# Patient Record
Sex: MALE | Race: WHITE | Employment: OTHER | URBAN - NONMETROPOLITAN AREA
[De-identification: names, ages, dates, MRNs, and addresses within clinical notes are randomized per-mention and may not be internally consistent; named-entity substitution may affect disease eponyms.]

---

## 2017-01-19 RX ORDER — LOSARTAN POTASSIUM 50 MG/1
TABLET ORAL
Qty: 180 TABLET | Refills: 2 | Status: SHIPPED | OUTPATIENT
Start: 2017-01-19 | End: 2017-04-25 | Stop reason: ALTCHOICE

## 2017-04-17 ENCOUNTER — HOSPITAL ENCOUNTER (OUTPATIENT)
Age: 82
Discharge: HOME | End: 2017-04-17
Payer: MEDICARE

## 2017-04-17 DIAGNOSIS — E78.5 HYPERLIPIDEMIA, UNSPECIFIED HYPERLIPIDEMIA TYPE: ICD-10-CM

## 2017-04-17 DIAGNOSIS — I10 ESSENTIAL HYPERTENSION: ICD-10-CM

## 2017-04-17 DIAGNOSIS — D86.9 SARCOID: ICD-10-CM

## 2017-04-17 DIAGNOSIS — I49.3 PVC (PREMATURE VENTRICULAR CONTRACTION): ICD-10-CM

## 2017-04-17 DIAGNOSIS — E55.9 VITAMIN D DEFICIENCY DISEASE: ICD-10-CM

## 2017-04-17 DIAGNOSIS — Z95.0 PACEMAKER: ICD-10-CM

## 2017-04-17 LAB
ABSOLUTE EOS #: 0.3 K/UL (ref 0–0.4)
ABSOLUTE LYMPH #: 1 K/UL (ref 0.9–2.5)
ABSOLUTE MONO #: 0.6 K/UL (ref 0–1)
ALBUMIN SERPL-MCNC: 3.7 G/DL (ref 3.5–5.2)
ALBUMIN/GLOBULIN RATIO: ABNORMAL (ref 1–2.5)
ALP BLD-CCNC: 67 U/L (ref 40–129)
ALT SERPL-CCNC: 43 U/L (ref 5–41)
ANION GAP SERPL CALCULATED.3IONS-SCNC: 12 MMOL/L (ref 9–17)
AST SERPL-CCNC: 42 U/L
BASOPHILS # BLD: 1 % (ref 0–2)
BASOPHILS ABSOLUTE: 0 K/UL (ref 0–0.2)
BILIRUB SERPL-MCNC: 0.4 MG/DL (ref 0.3–1.2)
BUN BLDV-MCNC: 25 MG/DL (ref 8–23)
BUN/CREAT BLD: 16 (ref 9–20)
CALCIUM SERPL-MCNC: 8.9 MG/DL (ref 8.6–10.4)
CHLORIDE BLD-SCNC: 99 MMOL/L (ref 98–107)
CHOLESTEROL/HDL RATIO: 5
CHOLESTEROL: 144 MG/DL
CO2: 27 MMOL/L (ref 20–31)
CREAT SERPL-MCNC: 1.59 MG/DL (ref 0.7–1.2)
DIFFERENTIAL TYPE: YES
EOSINOPHILS RELATIVE PERCENT: 5 % (ref 0–5)
GFR AFRICAN AMERICAN: 51 ML/MIN
GFR NON-AFRICAN AMERICAN: 42 ML/MIN
GFR SERPL CREATININE-BSD FRML MDRD: ABNORMAL ML/MIN/{1.73_M2}
GFR SERPL CREATININE-BSD FRML MDRD: ABNORMAL ML/MIN/{1.73_M2}
GLUCOSE BLD-MCNC: 108 MG/DL (ref 70–99)
HCT VFR BLD CALC: 38 % (ref 41–53)
HDLC SERPL-MCNC: 29 MG/DL
HEMOGLOBIN: 12.5 G/DL (ref 13.5–17.5)
LDL CHOLESTEROL: 67 MG/DL (ref 0–130)
LYMPHOCYTES # BLD: 15 % (ref 13–44)
MAGNESIUM: 2.1 MG/DL (ref 1.6–2.6)
MCH RBC QN AUTO: 28.7 PG (ref 26–34)
MCHC RBC AUTO-ENTMCNC: 32.9 G/DL (ref 31–37)
MCV RBC AUTO: 87.3 FL (ref 80–100)
MONOCYTES # BLD: 10 % (ref 5–9)
PATIENT FASTING?: YES
PDW BLD-RTO: 15.7 % (ref 12.1–15.2)
PLATELET # BLD: 188 K/UL (ref 140–450)
PLATELET ESTIMATE: ABNORMAL
PMV BLD AUTO: ABNORMAL FL (ref 6–12)
POTASSIUM SERPL-SCNC: 4.6 MMOL/L (ref 3.7–5.3)
RBC # BLD: 4.36 M/UL (ref 4.5–5.9)
RBC # BLD: ABNORMAL 10*6/UL
SEG NEUTROPHILS: 69 % (ref 39–75)
SEGMENTED NEUTROPHILS ABSOLUTE COUNT: 4.5 K/UL (ref 2.1–6.5)
SODIUM BLD-SCNC: 138 MMOL/L (ref 135–144)
TOTAL PROTEIN: 7.1 G/DL (ref 6.4–8.3)
TRIGL SERPL-MCNC: 241 MG/DL
TSH SERPL DL<=0.05 MIU/L-ACNC: 2.22 MIU/L (ref 0.3–5)
VITAMIN D 25-HYDROXY: 25 NG/ML (ref 30–100)
VLDLC SERPL CALC-MCNC: 48 MG/DL (ref 1–30)
WBC # BLD: 6.5 K/UL (ref 3.5–11)
WBC # BLD: ABNORMAL 10*3/UL

## 2017-04-17 PROCEDURE — 82306 VITAMIN D 25 HYDROXY: CPT

## 2017-04-17 PROCEDURE — 85025 COMPLETE CBC W/AUTO DIFF WBC: CPT

## 2017-04-17 PROCEDURE — 36415 COLL VENOUS BLD VENIPUNCTURE: CPT

## 2017-04-17 PROCEDURE — 80053 COMPREHEN METABOLIC PANEL: CPT

## 2017-04-17 PROCEDURE — 84443 ASSAY THYROID STIM HORMONE: CPT

## 2017-04-17 PROCEDURE — 93005 ELECTROCARDIOGRAM TRACING: CPT

## 2017-04-17 PROCEDURE — 80061 LIPID PANEL: CPT

## 2017-04-17 PROCEDURE — 83735 ASSAY OF MAGNESIUM: CPT

## 2017-04-20 LAB
EKG ATRIAL RATE: 50 BPM
EKG Q-T INTERVAL: 554 MS
EKG QRS DURATION: 186 MS
EKG QTC CALCULATION (BAZETT): 562 MS
EKG R AXIS: -87 DEGREES
EKG T AXIS: 104 DEGREES
EKG VENTRICULAR RATE: 62 BPM

## 2017-04-25 ENCOUNTER — OFFICE VISIT (OUTPATIENT)
Dept: CARDIOLOGY CLINIC | Age: 82
End: 2017-04-25
Payer: MEDICARE

## 2017-04-25 VITALS
OXYGEN SATURATION: 94 % | DIASTOLIC BLOOD PRESSURE: 60 MMHG | HEART RATE: 66 BPM | BODY MASS INDEX: 36.77 KG/M2 | WEIGHT: 249 LBS | SYSTOLIC BLOOD PRESSURE: 98 MMHG

## 2017-04-25 DIAGNOSIS — E78.5 HYPERLIPIDEMIA, UNSPECIFIED HYPERLIPIDEMIA TYPE: ICD-10-CM

## 2017-04-25 DIAGNOSIS — E55.9 VITAMIN D DEFICIENCY DISEASE: ICD-10-CM

## 2017-04-25 DIAGNOSIS — I27.20 PULMONARY HTN (HCC): ICD-10-CM

## 2017-04-25 DIAGNOSIS — R06.02 SOB (SHORTNESS OF BREATH): Primary | ICD-10-CM

## 2017-04-25 DIAGNOSIS — I31.39 PERICARDIAL EFFUSION: ICD-10-CM

## 2017-04-25 DIAGNOSIS — Z95.0 PACEMAKER: ICD-10-CM

## 2017-04-25 DIAGNOSIS — I10 ESSENTIAL HYPERTENSION: ICD-10-CM

## 2017-04-25 PROCEDURE — 99214 OFFICE O/P EST MOD 30 MIN: CPT | Performed by: INTERNAL MEDICINE

## 2017-04-25 PROCEDURE — 93288 INTERROG EVL PM/LDLS PM IP: CPT | Performed by: INTERNAL MEDICINE

## 2017-04-25 RX ORDER — OXYBUTYNIN CHLORIDE 5 MG/1
5 TABLET ORAL 3 TIMES DAILY
COMMUNITY
End: 2017-12-01

## 2017-04-25 RX ORDER — TAMSULOSIN HYDROCHLORIDE 0.4 MG/1
0.4 CAPSULE ORAL 2 TIMES DAILY
COMMUNITY

## 2017-04-25 RX ORDER — FLUTICASONE PROPIONATE 110 UG/1
1 AEROSOL, METERED RESPIRATORY (INHALATION) 2 TIMES DAILY
COMMUNITY
End: 2017-12-01 | Stop reason: ALTCHOICE

## 2017-04-25 RX ORDER — LOSARTAN POTASSIUM 50 MG/1
50 TABLET ORAL DAILY
COMMUNITY
End: 2018-01-12 | Stop reason: CLARIF

## 2017-05-03 DIAGNOSIS — I10 ESSENTIAL HYPERTENSION: ICD-10-CM

## 2017-05-03 DIAGNOSIS — I31.39 PERICARDIAL EFFUSION: ICD-10-CM

## 2017-05-03 DIAGNOSIS — I47.1 PAROXYSMAL SUPRAVENTRICULAR TACHYCARDIA (HCC): ICD-10-CM

## 2017-05-04 ENCOUNTER — TELEPHONE (OUTPATIENT)
Dept: CARDIOLOGY CLINIC | Age: 82
End: 2017-05-04

## 2017-05-05 DIAGNOSIS — Z95.0 PACEMAKER: ICD-10-CM

## 2017-05-05 DIAGNOSIS — R06.02 SOB (SHORTNESS OF BREATH): ICD-10-CM

## 2017-05-05 DIAGNOSIS — E55.9 VITAMIN D DEFICIENCY DISEASE: ICD-10-CM

## 2017-05-05 DIAGNOSIS — I27.20 PULMONARY HTN (HCC): ICD-10-CM

## 2017-05-05 DIAGNOSIS — I10 ESSENTIAL HYPERTENSION: ICD-10-CM

## 2017-05-05 DIAGNOSIS — E78.5 HYPERLIPIDEMIA, UNSPECIFIED HYPERLIPIDEMIA TYPE: ICD-10-CM

## 2017-05-05 DIAGNOSIS — I31.39 PERICARDIAL EFFUSION: ICD-10-CM

## 2017-08-07 RX ORDER — CARVEDILOL 25 MG/1
TABLET ORAL
Qty: 90 TABLET | Refills: 12 | Status: SHIPPED | OUTPATIENT
Start: 2017-08-07 | End: 2017-12-01 | Stop reason: ALTCHOICE

## 2017-10-18 RX ORDER — LOSARTAN POTASSIUM 50 MG/1
TABLET ORAL
Qty: 180 TABLET | Refills: 2 | Status: SHIPPED | OUTPATIENT
Start: 2017-10-18 | End: 2017-10-31

## 2017-10-23 ENCOUNTER — HOSPITAL ENCOUNTER (OUTPATIENT)
Age: 82
Discharge: HOME | End: 2017-10-23
Payer: MEDICARE

## 2017-10-23 DIAGNOSIS — I31.39 PERICARDIAL EFFUSION: ICD-10-CM

## 2017-10-23 DIAGNOSIS — E78.5 HYPERLIPIDEMIA, UNSPECIFIED HYPERLIPIDEMIA TYPE: ICD-10-CM

## 2017-10-23 DIAGNOSIS — I27.20 PULMONARY HTN (HCC): ICD-10-CM

## 2017-10-23 DIAGNOSIS — E55.9 VITAMIN D DEFICIENCY DISEASE: ICD-10-CM

## 2017-10-23 DIAGNOSIS — R06.02 SOB (SHORTNESS OF BREATH): ICD-10-CM

## 2017-10-23 DIAGNOSIS — Z95.0 PACEMAKER: ICD-10-CM

## 2017-10-23 DIAGNOSIS — I10 ESSENTIAL HYPERTENSION: ICD-10-CM

## 2017-10-23 LAB
ABSOLUTE EOS #: 0.4 K/UL (ref 0–0.4)
ABSOLUTE IMMATURE GRANULOCYTE: ABNORMAL K/UL (ref 0–0.3)
ABSOLUTE LYMPH #: 1 K/UL (ref 1–4.8)
ABSOLUTE MONO #: 0.8 K/UL (ref 0–1)
ALBUMIN SERPL-MCNC: 3.9 G/DL (ref 3.5–5.2)
ALBUMIN/GLOBULIN RATIO: ABNORMAL (ref 1–2.5)
ALP BLD-CCNC: 64 U/L (ref 40–129)
ALT SERPL-CCNC: 50 U/L (ref 5–41)
ANION GAP SERPL CALCULATED.3IONS-SCNC: 10 MMOL/L (ref 9–17)
AST SERPL-CCNC: 42 U/L
BASOPHILS # BLD: 0 %
BASOPHILS ABSOLUTE: 0 K/UL (ref 0–0.2)
BILIRUB SERPL-MCNC: 0.41 MG/DL (ref 0.3–1.2)
BUN BLDV-MCNC: 26 MG/DL (ref 8–23)
BUN/CREAT BLD: 16 (ref 9–20)
CALCIUM SERPL-MCNC: 9 MG/DL (ref 8.6–10.4)
CHLORIDE BLD-SCNC: 100 MMOL/L (ref 98–107)
CHOLESTEROL/HDL RATIO: 4.2
CHOLESTEROL: 142 MG/DL
CO2: 29 MMOL/L (ref 20–31)
CREAT SERPL-MCNC: 1.58 MG/DL (ref 0.7–1.2)
DIFFERENTIAL TYPE: YES
EKG ATRIAL RATE: 234 BPM
EKG P-R INTERVAL: 144 MS
EKG Q-T INTERVAL: 540 MS
EKG QRS DURATION: 196 MS
EKG QTC CALCULATION (BAZETT): 566 MS
EKG R AXIS: -80 DEGREES
EKG T AXIS: 117 DEGREES
EKG VENTRICULAR RATE: 66 BPM
EOSINOPHILS RELATIVE PERCENT: 5 %
GFR AFRICAN AMERICAN: 51 ML/MIN
GFR NON-AFRICAN AMERICAN: 42 ML/MIN
GFR SERPL CREATININE-BSD FRML MDRD: ABNORMAL ML/MIN/{1.73_M2}
GFR SERPL CREATININE-BSD FRML MDRD: ABNORMAL ML/MIN/{1.73_M2}
GLUCOSE BLD-MCNC: 113 MG/DL (ref 70–99)
HCT VFR BLD CALC: 37.6 % (ref 41–53)
HDLC SERPL-MCNC: 34 MG/DL
HEMOGLOBIN: 12.4 G/DL (ref 13.5–17.5)
IMMATURE GRANULOCYTES: ABNORMAL %
LDL CHOLESTEROL: 69 MG/DL (ref 0–130)
LYMPHOCYTES # BLD: 13 %
MAGNESIUM: 2.3 MG/DL (ref 1.6–2.6)
MCH RBC QN AUTO: 27.5 PG (ref 26–34)
MCHC RBC AUTO-ENTMCNC: 32.9 G/DL (ref 31–37)
MCV RBC AUTO: 83.8 FL (ref 80–100)
MONOCYTES # BLD: 10 %
PATIENT FASTING?: YES
PDW BLD-RTO: 15.6 % (ref 12.1–15.2)
PLATELET # BLD: 180 K/UL (ref 140–450)
PLATELET ESTIMATE: ABNORMAL
PMV BLD AUTO: ABNORMAL FL (ref 6–12)
POTASSIUM SERPL-SCNC: 4.1 MMOL/L (ref 3.7–5.3)
RBC # BLD: 4.49 M/UL (ref 4.5–5.9)
RBC # BLD: ABNORMAL 10*6/UL
SEG NEUTROPHILS: 72 %
SEGMENTED NEUTROPHILS ABSOLUTE COUNT: 5.7 K/UL (ref 2.1–6.5)
SODIUM BLD-SCNC: 139 MMOL/L (ref 135–144)
TOTAL PROTEIN: 7.2 G/DL (ref 6.4–8.3)
TRIGL SERPL-MCNC: 197 MG/DL
TSH SERPL DL<=0.05 MIU/L-ACNC: 1.93 MIU/L (ref 0.3–5)
VITAMIN D 25-HYDROXY: 27.8 NG/ML (ref 30–100)
VLDLC SERPL CALC-MCNC: ABNORMAL MG/DL (ref 1–30)
WBC # BLD: 7.8 K/UL (ref 3.5–11)
WBC # BLD: ABNORMAL 10*3/UL

## 2017-10-23 PROCEDURE — 83735 ASSAY OF MAGNESIUM: CPT

## 2017-10-23 PROCEDURE — 82306 VITAMIN D 25 HYDROXY: CPT

## 2017-10-23 PROCEDURE — 36415 COLL VENOUS BLD VENIPUNCTURE: CPT

## 2017-10-23 PROCEDURE — 93005 ELECTROCARDIOGRAM TRACING: CPT

## 2017-10-23 PROCEDURE — 85025 COMPLETE CBC W/AUTO DIFF WBC: CPT

## 2017-10-23 PROCEDURE — 80061 LIPID PANEL: CPT

## 2017-10-23 PROCEDURE — 84443 ASSAY THYROID STIM HORMONE: CPT

## 2017-10-23 PROCEDURE — 80053 COMPREHEN METABOLIC PANEL: CPT

## 2017-10-31 ENCOUNTER — OFFICE VISIT (OUTPATIENT)
Dept: CARDIOLOGY CLINIC | Age: 82
End: 2017-10-31
Payer: MEDICARE

## 2017-10-31 VITALS
BODY MASS INDEX: 38.4 KG/M2 | DIASTOLIC BLOOD PRESSURE: 60 MMHG | SYSTOLIC BLOOD PRESSURE: 102 MMHG | HEART RATE: 60 BPM | WEIGHT: 260 LBS | OXYGEN SATURATION: 97 %

## 2017-10-31 DIAGNOSIS — N18.30 STAGE 3 CHRONIC KIDNEY DISEASE (HCC): Primary | ICD-10-CM

## 2017-10-31 DIAGNOSIS — Z95.0 PACEMAKER: ICD-10-CM

## 2017-10-31 DIAGNOSIS — I27.20 PULMONARY HTN (HCC): ICD-10-CM

## 2017-10-31 DIAGNOSIS — I49.3 PVC (PREMATURE VENTRICULAR CONTRACTION): ICD-10-CM

## 2017-10-31 DIAGNOSIS — E55.9 VITAMIN D DEFICIENCY DISEASE: ICD-10-CM

## 2017-10-31 DIAGNOSIS — E78.5 HYPERLIPIDEMIA, UNSPECIFIED HYPERLIPIDEMIA TYPE: ICD-10-CM

## 2017-10-31 DIAGNOSIS — I10 ESSENTIAL HYPERTENSION: ICD-10-CM

## 2017-10-31 PROCEDURE — 93288 INTERROG EVL PM/LDLS PM IP: CPT | Performed by: INTERNAL MEDICINE

## 2017-10-31 PROCEDURE — 99214 OFFICE O/P EST MOD 30 MIN: CPT | Performed by: INTERNAL MEDICINE

## 2017-10-31 RX ORDER — FUROSEMIDE 40 MG/1
40 TABLET ORAL DAILY
COMMUNITY
End: 2018-01-01 | Stop reason: CLARIF

## 2017-10-31 RX ORDER — CARVEDILOL 25 MG/1
25 TABLET ORAL 2 TIMES DAILY WITH MEALS
COMMUNITY
End: 2017-12-01 | Stop reason: ALTCHOICE

## 2017-10-31 RX ORDER — SPIRONOLACTONE 25 MG/1
25 TABLET ORAL DAILY
Qty: 30 TABLET | Refills: 3 | Status: SHIPPED | OUTPATIENT
Start: 2017-10-31 | End: 2017-12-01 | Stop reason: DRUGHIGH

## 2017-10-31 RX ORDER — CHLORAL HYDRATE 500 MG
3000 CAPSULE ORAL 3 TIMES DAILY
COMMUNITY

## 2017-10-31 NOTE — PATIENT INSTRUCTIONS
Stop norvasc(amlodipine)  Decrease coreg to 25mg 1 tab twice a day  Decrease lasix 40mg  to once a day   Start aldactone 25mth daily  Labs in 1 week

## 2017-11-01 NOTE — PROGRESS NOTES
activity at home. If he does do any walking, he becomes dyspneic, but his wife assures us he does essentially no activity. He has never had a myocardial infarction. He denies any syncope. He has had occasional lightheadedness. He did see Dr. Wilton Bolaños at [place], who stated that he has neuropathy now with weakness of both lower extremities. CARDIAC RISK FACTORS:  Known CAD:  Negative. Hypertension:  Positive. Hyperlipidemia:  Positive. Other Family Members:  Positive. Peripheral Vascular Disease:  Negative. Smoking:  Negative. Diabetes:  Negative. MEDICATIONS AT HOME:  He is currently on Xanax 0.5 mg b.i.d., aspirin 81 mg daily, Coreg 25 mg one and a half tablets b.i.d., Zyrtec 10 mg daily, Nexium 40 mg daily, Proscar 5 mg daily, Flovent Diskus daily, Lasix 40 mg b.i.d., Cozaar 50 mg daily, Norvasc 2.5 mg daily, Ditropan 5 mg t.i.d., Pravachol 80 mg daily, Betapace 80 mg b.i.d., Flomax 0.4 mg daily and vitamin D 1000 units daily. PAST MEDICAL HISTORY:  Sarcoidosis followed in [facility]. Tonsillectomy in 1942. Right lower lobe resection on 2000 by Dr. Pooja Casiano followed by Radiation Therapy. Catheterization by Dr. Shelly Kenny in  that showed 50% to 60% disease in the PDA of the right coronary artery with unremarkable LAD and circumflex and normal LV function. Ablation for atrial tachycardia on 2005 by Dr. Sami Cobb. 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 DDD pacemaker implanted on 2009. Medtronic Adapta still at the beginning of life. Restrictive lung disease, hypertension, and hyperlipidemia. He has chronic renal insufficiency now and also neuropathy. FAMILY HISTORY:  His mother  of ALS at 79. His brother  at 67 of MI.    SOCIAL HISTORY:  He is [de-identified]years old, 5 children, . He does not smoke or drink alcohol. He is extremely inactive. REVIEW OF SYSTEMS:  Cardiac as above.   Other 10 systems reviewed including neurologic, psychiatric, pulmonary, cardiac, GI, , renal, and musculoskeletal.  He has had no weight loss or weight gain. He has developed a fairly significant amount of edema in both lower extremities. He denies fevers, sweats, or chills. He has been told to drink 2 liters of water a day. He sees, Dr. Paulo Apgar, Nephrologist for his renal insufficiency. PHYSICAL EXAMINATION:  VITAL SIGNS:  His blood pressure was 102/60 in both arms with a heart rate of 60 and regular. Respiratory rate 18. O2 sat 97%. Weight 260 pounds. GENERAL:  He is a pleasant [de-identified]year-old gentleman. He denied pain. He was oriented to person, place, and time. He answered questions appropriately. SKIN:  No unusual skin changes. HEENT:  The pupils are equally round and reactive to light and accommodation. Extraocular movements were intact. Mucous membranes were dry. NECK:  No JVD. Good carotid pulses. No carotid bruits. No lymphadenopathy or thyromegaly. CARDIOVASCULAR EXAM:  S1 and S2 were normal.  No S3 or S4. Soft systolic blowing type murmur. No diastolic murmur. PMI was normal.  No lifts, thrusts or pericardial friction rub. LUNGS:  Quite clear to auscultation and percussion. ABDOMEN:  Soft and nontender. Somewhat protuberant. I could not determine if there was ascites present. EXTREMITIES:  He has had at least 3+ edema in both lower extremities. NEUROLOGIC EXAM:  Unremarkable. PSYCHIATRIC EXAM:  Unremarkable. LABORATORY DATA:  From 10/23/2017, sodium 139, potassium 4.1, BUN of 26, creatinine 1.58, his magnesium is 2.3, calcium 9.0. Cholesterol 142 with an HDL of 34, LDL 69, triglycerides 197, ALT was 50, AST was 42, TSH is 1.93, vitamin D 27.8. White count 7.8, hemoglobin 12.4 with a platelet count of 253,697. EKG showed an AV dual paced rhythm. Pacemaker showed 2.5 years left on battery life with him in ventricular pacing 100% of the time and in atrial pacing 81% of the time. IMPRESSION:  1.   Lowell Durbin sinus syndrome, normal functioning Medtronic pacemaker implanted on 05/18/2009, pacing in the ventricle at 100% of the time, and 80% of the time in atrium. 2.  Right lower lobectomy by Dr. Mariela Hendricks on 03/17/2000 for cancer. Treated with chemotherapy, no recurrence. 3.  Mild coronary artery disease with catheterization in 1992 and on 10/22/2005 showing 50% distal RCA with unremarkable LAD in the circumflex with normal LV function, EF of 60%. 4.  Chronic renal insufficiency, creatinine 1.58 followed by Dr. Joana Quijano.  5.  Sarcoidosis diagnosed on 01/12/2010 by Dr. Bryan Mendoza at [facility] with a pericardial biopsy. 6.  Right-sided CHF with mild ascites and 3+ edema in both lower extremities with him drinking 2 liters of water a day. 7. Intermittent atrial fibrillation, not on Coumadin because of prostate biopsies. 8.  Prostate cancer, followed by Dr. Evelyn Linares. 9.  Peripheral neuropathy, followed by Dr. Micky Brito. 10.  Hypotension today with a blood pressure of 102. PLAN:  1. Decrease water intake to 1 liter a day, if possible. 2.  Stop Norvasc. 3.  Decrease Coreg to 25 mg b.i.d. rather than one and a half tablets b.i.d.  4.  Decrease Lasix to 40 mg daily. 5.  Add Aldactone 25 mg daily. 6.  Chem-7 in one week. 7.  We will see him in one month with a CBC and BMP to recheck his kidneys and his weight. 8.  To have him take his weight daily. 9.  We will see him in six months for a pacer evaluation. DISCUSSION:  Mr. Hannah Mabry has significant amount of fluid with ascites and pedal edema. I have had him decrease his water intake. I will add Aldactone and decrease his Lasix back to 40 mg once a day. He is also hypotensive today with a blood pressure of 102. I stopped his Aldactone and decreased his Coreg to 25 mg b.i.d.    I will do a blood work in one week and reassess in one month his fluid status and renal function. Thank you very much for allowing me the privilege of seeing Mr. Hannah Mabry.   Any questions on my

## 2017-11-30 DIAGNOSIS — Z95.0 PACEMAKER: ICD-10-CM

## 2017-11-30 DIAGNOSIS — E78.5 HYPERLIPIDEMIA, UNSPECIFIED HYPERLIPIDEMIA TYPE: ICD-10-CM

## 2017-11-30 DIAGNOSIS — N18.30 STAGE 3 CHRONIC KIDNEY DISEASE (HCC): ICD-10-CM

## 2017-11-30 DIAGNOSIS — E55.9 VITAMIN D DEFICIENCY DISEASE: ICD-10-CM

## 2017-11-30 DIAGNOSIS — I27.20 PULMONARY HTN (HCC): ICD-10-CM

## 2017-11-30 DIAGNOSIS — I10 ESSENTIAL HYPERTENSION: ICD-10-CM

## 2017-11-30 DIAGNOSIS — I49.3 PVC (PREMATURE VENTRICULAR CONTRACTION): ICD-10-CM

## 2017-12-01 ENCOUNTER — OFFICE VISIT (OUTPATIENT)
Dept: CARDIOLOGY CLINIC | Age: 82
End: 2017-12-01
Payer: MEDICARE

## 2017-12-01 VITALS — WEIGHT: 245 LBS | BODY MASS INDEX: 36.18 KG/M2

## 2017-12-01 DIAGNOSIS — I27.20 PULMONARY HTN (HCC): Primary | ICD-10-CM

## 2017-12-01 DIAGNOSIS — Z95.0 PACEMAKER: ICD-10-CM

## 2017-12-01 DIAGNOSIS — I10 ESSENTIAL HYPERTENSION: ICD-10-CM

## 2017-12-01 DIAGNOSIS — N18.30 STAGE 3 CHRONIC KIDNEY DISEASE (HCC): ICD-10-CM

## 2017-12-01 PROCEDURE — 99213 OFFICE O/P EST LOW 20 MIN: CPT | Performed by: INTERNAL MEDICINE

## 2017-12-01 RX ORDER — SPIRONOLACTONE 25 MG/1
25 TABLET ORAL EVERY OTHER DAY
Status: SHIPPED | COMMUNITY
Start: 2017-12-01 | End: 2018-05-31 | Stop reason: SDUPTHER

## 2017-12-01 RX ORDER — CARVEDILOL 25 MG/1
25 TABLET ORAL DAILY
COMMUNITY
End: 2018-01-01 | Stop reason: SDUPTHER

## 2017-12-01 NOTE — PROGRESS NOTES
Ov Dr Ryan Prabhakar for one month follow up  With labs  Chip Fellers am-got out of bed to fast  And went to bathroom and got dizzy lost balance  And fell in bathroom   emt was called bp was 74/40 they   Helped him up but didn't go to er   Injured right elbow and right little finger  bp has been running low  And allergies are bad  Saw Dr. Domenico Joshi last week urologist  Has had bladder inf  Went to [address] for ua and was put   On bactrim and pyridium finished now  Dec 29 scheduled for cysto in [place]   Fredy 15 dr Chelly Black  Jan 16 [facility]   Jan 17 dr. Zoey Marrero  Jan 18 Pulmonologist         Will DECREASE COREG 25mg to one daily   Will HOLD COZAAR  Will take LASIX every other day   Will take ALDACTONE  Every other day   Will ALTERNATE LASIX AND ALDACTONE DAILY  Will check BMP in one week(call with results)  Will check WT and BP daily   Will follow up in Jan with CMP and CBC prior to visit

## 2017-12-01 NOTE — PATIENT INSTRUCTIONS
Will DECREASE COREG 25mg to one daily   Will HOLD COZAAR  Will take LASIX every other day   Will take ALDACTONE  Every other day   Will ALTERNATE LASIX AND ALDACTONE DAILY  Will check BMP in one week(call with results)  Will check WT and BP daily   Will follow up in Jan with CMP and CBC prior to visit

## 2017-12-08 NOTE — PROGRESS NOTES
Jonathan Gan M.D. [address] [place] [address] [address]  [phone]          2017          Sharmila Huerta MD  [address]      RE:  Francisco Valdez  : 1935    Dear Dr. Malachy Dakins:    [address]:  1. Congestive heart failure. 2.  Renal insufficiency. HISTORY OF PRESENT ILLNESS:  I had the pleasure of seeing Mr. Makenzie Jang in our office on 2017. Hopefully, you received my full H and P from 10/31/2017. At that time, he was hypotensive but also had right-sided CHF with ascites and 3+ edema in both lower extremities. We decreased his water intake to 1 L per day and stopped the Norvasc. We decreased Coreg to 25 mg b.i.d. and decreased Lasix from 40 mg twice a day to 40 mg once a day. I also added Aldactone 25 mg daily. He has dropped 15 pounds over the last month. His edema has completely resolved in his lower extremities as has his ascites. He has developed hypotension, however, with his blood pressure in the 90s even off the Norvasc and on the lower dose of Coreg. He did fall last week and the EMS had to come and help him back up. His blood pressure at that time was 80s. He denies any chest pain. He is awake and alert, looks good, sounds good as I see him today. Again, he is down 15 pounds. MEDICATIONS:  His medications today are vitamins daily, spironolactone 25 mg daily, fish oil 2000 mg daily, Lasix 40 mg daily, Coreg 25 mg b.i.d., Flomax 0.4 mg daily, Cozaar 50 mg daily, Betapace 80 mg b.i.d., Proscar 5 mg daily, Nexium 40 mg daily, Pravachol 80 mg daily, Flovent daily, Zyrtec daily, and aspirin 81 mg daily. PHYSICAL EXAMINATION:   VITAL SIGNS:  His blood pressure was 94/60 in both arms with a heart rate of 70 and regular. Respirations were 18. O2 saturation was 94%. GENERAL:  He is a pleasant [de-identified]year-old gentleman. Denied pain.   He was oriented to person, place and

## 2017-12-11 RX ORDER — SOTALOL HYDROCHLORIDE 80 MG/1
TABLET ORAL
Qty: 180 TABLET | Refills: 3 | Status: SHIPPED | OUTPATIENT
Start: 2017-12-11 | End: 2019-01-01 | Stop reason: SDUPTHER

## 2018-01-01 ENCOUNTER — OFFICE VISIT (OUTPATIENT)
Dept: CARDIOLOGY CLINIC | Age: 83
End: 2018-01-01
Payer: MEDICARE

## 2018-01-01 VITALS
BODY MASS INDEX: 38.54 KG/M2 | DIASTOLIC BLOOD PRESSURE: 70 MMHG | OXYGEN SATURATION: 97 % | SYSTOLIC BLOOD PRESSURE: 120 MMHG | HEART RATE: 76 BPM | WEIGHT: 261 LBS

## 2018-01-01 DIAGNOSIS — I10 ESSENTIAL HYPERTENSION: ICD-10-CM

## 2018-01-01 DIAGNOSIS — I47.1 PAROXYSMAL SUPRAVENTRICULAR TACHYCARDIA (HCC): ICD-10-CM

## 2018-01-01 DIAGNOSIS — I27.20 PULMONARY HTN (HCC): ICD-10-CM

## 2018-01-01 DIAGNOSIS — E78.5 HYPERLIPIDEMIA, UNSPECIFIED HYPERLIPIDEMIA TYPE: ICD-10-CM

## 2018-01-01 DIAGNOSIS — I49.3 PVC (PREMATURE VENTRICULAR CONTRACTION): ICD-10-CM

## 2018-01-01 DIAGNOSIS — E55.9 VITAMIN D DEFICIENCY DISEASE: ICD-10-CM

## 2018-01-01 DIAGNOSIS — N18.30 STAGE 3 CHRONIC KIDNEY DISEASE (HCC): ICD-10-CM

## 2018-01-01 DIAGNOSIS — Z95.0 PACEMAKER: ICD-10-CM

## 2018-01-01 DIAGNOSIS — I47.1 PAROXYSMAL SUPRAVENTRICULAR TACHYCARDIA (HCC): Primary | ICD-10-CM

## 2018-01-01 LAB
ALBUMIN SERPL-MCNC: 3.9 G/DL
ALP BLD-CCNC: 56 U/L
ALT SERPL-CCNC: 55 U/L
ANION GAP SERPL CALCULATED.3IONS-SCNC: NORMAL MMOL/L
AST SERPL-CCNC: 49 U/L
BASOPHILS ABSOLUTE: 0 /ΜL
BASOPHILS RELATIVE PERCENT: 0.7 %
BILIRUB SERPL-MCNC: 0.5 MG/DL (ref 0.1–1.4)
BUN BLDV-MCNC: 25 MG/DL
CALCIUM SERPL-MCNC: 8.8 MG/DL
CHLORIDE BLD-SCNC: 105 MMOL/L
CHOLESTEROL, TOTAL: 141 MG/DL
CHOLESTEROL/HDL RATIO: 5.22
CO2: 31 MMOL/L
CREAT SERPL-MCNC: 1.4 MG/DL
EOSINOPHILS ABSOLUTE: 0.2 /ΜL
EOSINOPHILS RELATIVE PERCENT: 3.3 %
GFR CALCULATED: NORMAL
GLUCOSE BLD-MCNC: 103 MG/DL
HCT VFR BLD CALC: 42 % (ref 41–53)
HDLC SERPL-MCNC: 27 MG/DL (ref 35–70)
HEMOGLOBIN: 13.8 G/DL (ref 13.5–17.5)
LDL CHOLESTEROL CALCULATED: 72 MG/DL (ref 0–160)
LYMPHOCYTES ABSOLUTE: 1.3 /ΜL
LYMPHOCYTES RELATIVE PERCENT: 20.7 %
MCH RBC QN AUTO: 30.1 PG
MCHC RBC AUTO-ENTMCNC: 32.9 G/DL
MCV RBC AUTO: 91.2 FL
MONOCYTES ABSOLUTE: 0.7 /ΜL
MONOCYTES RELATIVE PERCENT: 10.5 %
NEUTROPHILS ABSOLUTE: 4.2 /ΜL
NEUTROPHILS RELATIVE PERCENT: 64.8 %
PDW BLD-RTO: 15 %
PLATELET # BLD: 146 K/ΜL
PMV BLD AUTO: 7.9 FL
POTASSIUM SERPL-SCNC: 4.4 MMOL/L
RBC # BLD: 4.59 10^6/ΜL
SODIUM BLD-SCNC: 143 MMOL/L
TOTAL PROTEIN: 7.2
TRIGL SERPL-MCNC: 208 MG/DL
VITAMIN D 25-HYDROXY: 35.8
VITAMIN D2, 25 HYDROXY: NORMAL
VITAMIN D3,25 HYDROXY: NORMAL
VLDLC SERPL CALC-MCNC: 42 MG/DL
WBC # BLD: 6.5 10^3/ML

## 2018-01-01 PROCEDURE — 93288 INTERROG EVL PM/LDLS PM IP: CPT | Performed by: INTERNAL MEDICINE

## 2018-01-01 PROCEDURE — 99214 OFFICE O/P EST MOD 30 MIN: CPT | Performed by: INTERNAL MEDICINE

## 2018-01-01 RX ORDER — CARVEDILOL 25 MG/1
25 TABLET ORAL DAILY
Qty: 90 TABLET | Refills: 3 | Status: SHIPPED | OUTPATIENT
Start: 2018-01-01 | End: 2019-01-01 | Stop reason: ALTCHOICE

## 2018-01-01 RX ORDER — SPIRONOLACTONE 25 MG/1
25 TABLET ORAL DAILY
Qty: 30 TABLET | Refills: 11 | Status: SHIPPED | OUTPATIENT
Start: 2018-01-01 | End: 2019-01-01 | Stop reason: ALTCHOICE

## 2018-01-01 RX ORDER — IPRATROPIUM BROMIDE 21 UG/1
2 SPRAY, METERED NASAL EVERY 12 HOURS
COMMUNITY
End: 2019-01-01 | Stop reason: ALTCHOICE

## 2018-01-01 RX ORDER — LOTEPREDNOL ETABONATE 5 MG/ML
1 SUSPENSION/ DROPS OPHTHALMIC DAILY
COMMUNITY

## 2018-01-01 RX ORDER — BUMETANIDE 2 MG/1
2 TABLET ORAL 2 TIMES DAILY
COMMUNITY

## 2018-01-11 DIAGNOSIS — Z95.0 PACEMAKER: ICD-10-CM

## 2018-01-11 DIAGNOSIS — N18.30 STAGE 3 CHRONIC KIDNEY DISEASE (HCC): ICD-10-CM

## 2018-01-11 DIAGNOSIS — I10 ESSENTIAL HYPERTENSION: ICD-10-CM

## 2018-01-11 DIAGNOSIS — I27.20 PULMONARY HTN (HCC): ICD-10-CM

## 2018-01-11 DIAGNOSIS — R06.02 SOB (SHORTNESS OF BREATH): ICD-10-CM

## 2018-01-12 ENCOUNTER — OFFICE VISIT (OUTPATIENT)
Dept: CARDIOLOGY CLINIC | Age: 83
End: 2018-01-12
Payer: MEDICARE

## 2018-01-12 VITALS
HEART RATE: 68 BPM | OXYGEN SATURATION: 94 % | WEIGHT: 253 LBS | SYSTOLIC BLOOD PRESSURE: 100 MMHG | BODY MASS INDEX: 37.36 KG/M2 | DIASTOLIC BLOOD PRESSURE: 60 MMHG

## 2018-01-12 DIAGNOSIS — R06.02 SOB (SHORTNESS OF BREATH): Primary | ICD-10-CM

## 2018-01-12 PROCEDURE — 99214 OFFICE O/P EST MOD 30 MIN: CPT | Performed by: INTERNAL MEDICINE

## 2018-01-12 NOTE — PATIENT INSTRUCTIONS
If weight goes up to 250lbs   Then take lasix and aldactone  Every day until weight goes back to 145-147lbs  Then to back to every other day

## 2018-01-18 NOTE — PROGRESS NOTES
wife have been following his weight very carefully. He is on Lasix and Aldactone each every other day. His weight has been in the 247 to 248 range. He has had no chest pain or chest discomfort. Again, he is very inactive because of neuropathy and because of chronic shortness of breath. He is pending having a repeat surgery for colectomy and removal of the tumor from his bowel. CARDIAC RISK FACTORS:  Known CAD:  Negative. Hypertension:  Positive. Hyperlipidemia:  Positive. Other Family Members:  Positive. Peripheral Vascular Disease:  Negative. Smoking:  Negative. Diabetes:  Negative. MEDICATIONS AT HOME:  He is currently on Xanax 0.5 mg b.i.d., aspirin 81 mg daily, Coreg 25 mg daily, Zyrtec 10 mg p.r.n., Nexium 40 mg daily, Proscar 5 mg daily, Flovent daily, Lasix 40 mg every other day alternating with Aldactone 25 mg every other day, Pravachol 80 mg daily, sotalol 80 mg b.i.d., Flomax 0.4 mg b.i.d., vitamin D daily. PAST MEDICAL HISTORY:  1. Sarcoidosis, followed in [place]. 2.  Tonsillectomy in 1942.  3.  Right lower lobe resection on 2000 by Dr. Clarisa Bowles, followed by radiation therapy. 4.  Catheterization in  by Dr. Leanna Yuan that showed 50% to 60% disease in the PDA of the right coronary artery with unremarkable LAD and circumflex, normal LV function. 5.  Ablation for atrial tachycardia on 2005 by Dr. Megan García. 6.  Pericardial biopsy in 2010 for his sarcoid treated with steroids. 7.  DDD pacemaker implanted on 2009, which is a Medtronic Adapta still at beginning of life. 8.  Restrictive lung disease. 9.  Hypertension. 10.  Hyperlipidemia. 11.  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 Garima Edward. 12.  CHF, mainly right-sided secondary to diastolic dysfunction. FAMILY HISTORY:  Mother  of ALS at 79.   Brother  at 67 of an MI.    SOCIAL HISTORY:  He is [de-identified]years old, 5 children, . Does not smoke or drink alcohol. Very inactive. His weight is followed by his wife and his diuretics adjusted by his wife. REVIEW OF SYSTEMS:  Cardiac as above. Other 10 systems reviewed including neurologic, psychiatric, pulmonary, cardiac, GI, , renal, and musculoskeletal.  He has had no weight loss or weight gain. No change in bowel habits, no blood in stool. No fevers, sweats or chills. He has shortness of breath, which is about at his baseline. PHYSICAL EXAMINATION:  VITAL SIGNS:  His blood pressure was 120/70 with a heart rate of 68 and regular. Respiratory rate 18. O2 saturation 94%. Weight 253 pounds. GENERAL:  He is a pleasant [de-identified]year-old gentleman. Denied pain. He was oriented to person, place, and time. Answered questions appropriately. SKIN:  No unusual skin changes. HEENT:  The pupils are equally round and reactive to light and accommodation. Extraocular movements were intact. Mucous membranes were dry. NECK:  No JVD. Good carotid pulses. No carotid bruits. No lymphadenopathy or thyromegaly. CARDIOVASCULAR EXAM:  S1 and S2 were normal.  No S3 or S4. Soft systolic blowing type murmur. No diastolic murmur. PMI was normal.  No lifts, thrusts, or pericardial friction rub. LUNGS:  Quite clear to auscultation and percussion. ABDOMEN:  Soft and nontender. Good bowel sounds. EXTREMITIES:  Good femoral pulses. Good pedal pulses. No pedal edema. Skin was warm and dry. No calf tenderness. Nail beds pink. Good cap refill. PULSES:  Bilaterally symmetrical radial, brachial and carotid pulses. No carotid bruits. Good femoral and pedal pulses. NEUROLOGIC EXAM:  Within normal limits. PSYCHIATRIC EXAM:  Within normal limits. LABORATORY DATA:  From 01/05/2018, BUN is 31, creatinine 1.5, sodium 138, potassium 4.6. AST was 40, ALT was 55. White count 7.8, hemoglobin 11.7 with a platelet count 864,089. EKG showed dual-paced rhythm.     Echocardiogram

## 2018-05-14 DIAGNOSIS — N18.30 STAGE 3 CHRONIC KIDNEY DISEASE (HCC): ICD-10-CM

## 2018-05-14 DIAGNOSIS — E78.5 HYPERLIPIDEMIA, UNSPECIFIED HYPERLIPIDEMIA TYPE: ICD-10-CM

## 2018-05-14 DIAGNOSIS — I10 ESSENTIAL HYPERTENSION: ICD-10-CM

## 2018-05-14 DIAGNOSIS — I49.3 PVC (PREMATURE VENTRICULAR CONTRACTION): ICD-10-CM

## 2018-05-14 DIAGNOSIS — E55.9 VITAMIN D DEFICIENCY DISEASE: ICD-10-CM

## 2018-05-14 DIAGNOSIS — Z95.0 PACEMAKER: ICD-10-CM

## 2018-05-14 DIAGNOSIS — I27.20 PULMONARY HTN (HCC): ICD-10-CM

## 2018-05-15 ENCOUNTER — OFFICE VISIT (OUTPATIENT)
Dept: CARDIOLOGY CLINIC | Age: 83
End: 2018-05-15
Payer: MEDICARE

## 2018-05-15 VITALS
BODY MASS INDEX: 38.1 KG/M2 | WEIGHT: 258 LBS | HEART RATE: 70 BPM | SYSTOLIC BLOOD PRESSURE: 120 MMHG | OXYGEN SATURATION: 96 % | DIASTOLIC BLOOD PRESSURE: 80 MMHG

## 2018-05-15 DIAGNOSIS — Z95.0 PACEMAKER: ICD-10-CM

## 2018-05-15 DIAGNOSIS — I27.20 PULMONARY HTN (HCC): ICD-10-CM

## 2018-05-15 DIAGNOSIS — I49.3 PVC (PREMATURE VENTRICULAR CONTRACTION): Primary | ICD-10-CM

## 2018-05-15 DIAGNOSIS — E55.9 VITAMIN D DEFICIENCY DISEASE: ICD-10-CM

## 2018-05-15 DIAGNOSIS — I47.1 PAROXYSMAL SUPRAVENTRICULAR TACHYCARDIA (HCC): ICD-10-CM

## 2018-05-15 DIAGNOSIS — I10 ESSENTIAL HYPERTENSION: ICD-10-CM

## 2018-05-15 DIAGNOSIS — E78.5 HYPERLIPIDEMIA, UNSPECIFIED HYPERLIPIDEMIA TYPE: ICD-10-CM

## 2018-05-15 PROCEDURE — 99214 OFFICE O/P EST MOD 30 MIN: CPT | Performed by: INTERNAL MEDICINE

## 2018-05-15 PROCEDURE — 93288 INTERROG EVL PM/LDLS PM IP: CPT | Performed by: INTERNAL MEDICINE

## 2018-05-15 RX ORDER — DIVALPROEX SODIUM 500 MG/1
500 TABLET, DELAYED RELEASE ORAL DAILY
COMMUNITY
End: 2018-01-01 | Stop reason: CLARIF

## 2018-05-30 DIAGNOSIS — N18.30 STAGE 3 CHRONIC KIDNEY DISEASE (HCC): ICD-10-CM

## 2018-05-30 DIAGNOSIS — Z95.0 PACEMAKER: ICD-10-CM

## 2018-05-31 DIAGNOSIS — N18.30 STAGE 3 CHRONIC KIDNEY DISEASE (HCC): ICD-10-CM

## 2018-05-31 DIAGNOSIS — Z95.0 PACEMAKER: ICD-10-CM

## 2018-05-31 RX ORDER — SPIRONOLACTONE 25 MG/1
25 TABLET ORAL DAILY
Qty: 30 TABLET | Refills: 3 | Status: SHIPPED | OUTPATIENT
Start: 2018-05-31 | End: 2018-01-01 | Stop reason: SDUPTHER

## 2018-05-31 RX ORDER — SPIRONOLACTONE 25 MG/1
25 TABLET ORAL EVERY OTHER DAY
Qty: 30 TABLET | Refills: 3 | Status: SHIPPED | OUTPATIENT
Start: 2018-05-31 | End: 2018-01-01 | Stop reason: CLARIF

## 2018-05-31 RX ORDER — SPIRONOLACTONE 25 MG/1
25 TABLET ORAL DAILY
Qty: 30 TABLET | Refills: 3 | OUTPATIENT
Start: 2018-05-31

## 2018-07-16 RX ORDER — LOSARTAN POTASSIUM 50 MG/1
TABLET ORAL
Qty: 180 TABLET | Refills: 2 | Status: SHIPPED | OUTPATIENT
Start: 2018-07-16 | End: 2018-07-16 | Stop reason: CLARIF

## 2019-01-01 ENCOUNTER — OFFICE VISIT (OUTPATIENT)
Dept: CARDIOLOGY CLINIC | Age: 84
End: 2019-01-01
Payer: MEDICARE

## 2019-01-01 VITALS
WEIGHT: 248 LBS | HEART RATE: 90 BPM | BODY MASS INDEX: 36.62 KG/M2 | SYSTOLIC BLOOD PRESSURE: 120 MMHG | DIASTOLIC BLOOD PRESSURE: 70 MMHG | OXYGEN SATURATION: 96 %

## 2019-01-01 DIAGNOSIS — I27.20 PULMONARY HTN (HCC): ICD-10-CM

## 2019-01-01 DIAGNOSIS — Z95.0 PACEMAKER: ICD-10-CM

## 2019-01-01 DIAGNOSIS — I47.1 PAROXYSMAL SUPRAVENTRICULAR TACHYCARDIA (HCC): Primary | ICD-10-CM

## 2019-01-01 DIAGNOSIS — E78.5 HYPERLIPIDEMIA, UNSPECIFIED HYPERLIPIDEMIA TYPE: ICD-10-CM

## 2019-01-01 DIAGNOSIS — E55.9 VITAMIN D DEFICIENCY DISEASE: ICD-10-CM

## 2019-01-01 DIAGNOSIS — I10 ESSENTIAL HYPERTENSION: ICD-10-CM

## 2019-01-01 PROCEDURE — 93285 PRGRMG DEV EVAL SCRMS IP: CPT | Performed by: INTERNAL MEDICINE

## 2019-01-01 PROCEDURE — 99214 OFFICE O/P EST MOD 30 MIN: CPT | Performed by: INTERNAL MEDICINE

## 2019-01-01 RX ORDER — SOTALOL HYDROCHLORIDE 80 MG/1
TABLET ORAL
Qty: 180 TABLET | Refills: 3 | Status: SHIPPED | OUTPATIENT
Start: 2019-01-01

## 2019-01-01 RX ORDER — FERROUS SULFATE 325(65) MG
325 TABLET ORAL
COMMUNITY

## 2019-01-01 RX ORDER — SIMVASTATIN 40 MG
40 TABLET ORAL NIGHTLY
COMMUNITY

## 2019-01-01 RX ORDER — OMEPRAZOLE 20 MG/1
20 CAPSULE, DELAYED RELEASE ORAL DAILY
COMMUNITY

## 2019-06-21 NOTE — PROGRESS NOTES
Tom Lopez M.D. [address] [address] [address]   [phone]      2019      Bernard Montes MD  [address]      RE:   Americo Jarquin  :  1935      Dear  [facility]:    CHIEF COMPLAINT:  1. Fatigue. 2.  Hypertension. 3.  Sick sinus syndrome. HISTORY OF PRESENT ILLNESS:  I had the pleasure of seeing Mr. Arpita Davis in our office on 2019. He is a pleasant 82-year-old gentleman who has sick sinus syndrome with a dual-chamber pacer placed on 2009, for bradycardia and syncope, which is the Medtronic Adapta DDD pacemaker. It was checked today and it is still at the beginning of life. He has approximately 2 years left on his battery. He is being ventricular pacing 100% of the time. He has mild coronary artery disease documented on a catheterization in  and another one on 10/26/2005. He had normal LV function. He has a history of SVT and had ablation by Dr. Marco Antonio Cross on 2005. He has overall normal LV function. He was found to have adenocarcinoma of the appendix after he had an appendectomy on 2017. He went back to surgery on 2018, and received a right colon, terminal ileum, and omentum resection for his adenocarcinoma, done by Dr. Arie Mobley. He has developed some Alzheimer's with some confusion. He is currently in [facility]. He had a colonoscopy on 2019, which was unremarkable. He had developed marked edema and cellulitis of his lower extremities and went to the wound clinic. This was due to chronic venous insufficiency. He had a nice result in the wound clinic and his edema markedly improved. He has subsequently been moved to [address] [place] [address], where he has resided since , due to his wife's health problems (my wife does not get sick. ..)    He looks about the best I have seen him for several years.   He is awake, alert, has minimal edema. Denies pain. He has had no PND or orthopnea. He is followed by Dr. Jeanette Quick for sarcoidosis and has overall done well. He has had no major flares of his sarcoidosis. Denies any syncope or near syncope. Denies any palpitations. He really has no complaints as I see him today. He is very inactive and generally walks with a walker at the [facility]. He has never had a myocardial infarction. He does have a history of paroxysmal atrial fibrillation. He has been controlled with Betapace. He is not anticoagulated because of his history of falls and because of hematuria. CARDIAC RISK FACTORS:  Known CAD:  Negative. Hypertension:  Positive. Hyperlipidemia:  Positive. Other Family Members:  Positive. Peripheral Vascular Disease:  Negative. Smoking:  Negative. Diabetes:  Negative. MEDICATIONS AT THIS TIME:  He is on Xanax 0.5 mg b.i.d., Bumex 2 mg b.i.d., iron 5 grains daily, Proscar 5 mg daily, Flovent daily, multivitamins daily, fish oil daily, Prilosec 20 mg daily, ProAir daily, Zocor 30 mg daily, Betapace 80 mg b.i.d., vitamin D 1000 units daily. PAST MEDICAL HISTORY:  1. He has sarcoidosis, followed by Dr. Jeanette Quick in [place], which has been stable. 2.  Tonsillectomy in 1942.  3.  Right lower lobe resection on 03/07/2000, by Dr. Claire Willoughby, following radiation for lung CA. 4.  Catheterization in 2005 by Dr. Kaushal Mckay that showed 50% to 60% disease in the PDA of the right coronary artery with unremarkable LAD and circumflex, normal LV function. 5.  Ablation for atrial tachycardia on 11/02/2005, by Dr. Antwan Zheng. 6.  Pericardial biopsy in 01/2010 when a sarcoid was diagnosed, treated with steroids, which is stable at this point. 7.  Sick sinus syndrome with a pacemaker implanted on 05/18/2009, which is a Medtronic, still beginning of life.   8.  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 Maira Mcguire. 9.  Diastolic dysfunction. 10.  Alzheimer's. FAMILY HISTORY:  Mother  of ALS at 76. Brother  at 67 of an MI.    SOCIAL HISTORY:  He is 82years old, , 5 children. Does not smoke or drink alcohol. He is very inactive and [facility] after his wife was unable to take care of him at home. 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 REVIEW OF SYSTEMS:  Cardiac as above. Other systems reviewed including constitutional, eyes, ears, nose and throat, cardiovascular, respiratory, GI, , musculoskeletal, integumentary, neurologic, psychiatric, endocrine, hematologic and allergic/immunologic are negative except for what is described above. PHYSICAL EXAMINATION:  VITAL SIGNS:  His blood pressure was 120/70 with a heart rate of 90 and regular. Respirations were 18. O2 saturation was 98%. Weight 248 pounds. GENERAL:  He is a pleasant 82year-old gentleman. Denied pain. He was oriented to person, place and time. Answered questions appropriately. SKIN:  No unusual skin changes. HEENT:  The pupils are equally round and intact. Mucous membranes were dry. NECK:  No JVD. Good carotid pulses. No carotid bruits. No lymphadenopathy or thyromegaly. CARDIOVASCULAR EXAM:  S1 and S2 were normal.  No S3 or S4. Soft systolic blowing type murmur. No diastolic murmur. PMI was normal.  No lift, thrust, or pericardial friction rub. LUNGS:  Quite clear to auscultation and percussion. ABDOMEN:  Soft and nontender. Good bowel sounds. EXTREMITIES:  Good femoral pulses. Good pedal pulses. Minimal pedal edema. Skin was warm and dry. No calf tenderness. Nail beds pink. Good cap refill. PULSES:  Bilateral symmetrical radial, brachial and carotid pulses. No carotid bruits. NEUROLOGIC EXAM:  Unremarkable. PSYCHIATRIC EXAM:  Unremarkable.     LABORATORY DATA:  His phosphorus was 3.5, coronary artery disease. I made no changes in medications. I will see him in 6 months in followup. Thank you very much for allowing me the privilege of seeing  Mr. Allegra Olmos. If you have any questions on my thoughts, please do not hesitate to contact me. Sincerely,        Ilda Ramirez    D: 06/19/2019 5:27:41     T: 06/20/2019 1:24:07     [id number]  Job#: [id number]   Doc#: [id number]    CC:   Bryan